# Patient Record
Sex: FEMALE | Race: WHITE
[De-identification: names, ages, dates, MRNs, and addresses within clinical notes are randomized per-mention and may not be internally consistent; named-entity substitution may affect disease eponyms.]

---

## 2021-04-05 ENCOUNTER — HOSPITAL ENCOUNTER (EMERGENCY)
Dept: HOSPITAL 63 - ER | Age: 51
Discharge: HOME | End: 2021-04-05
Payer: SELF-PAY

## 2021-04-05 VITALS — BODY MASS INDEX: 33.2 KG/M2 | HEIGHT: 63 IN | WEIGHT: 187.39 LBS

## 2021-04-05 VITALS — SYSTOLIC BLOOD PRESSURE: 96 MMHG | DIASTOLIC BLOOD PRESSURE: 45 MMHG

## 2021-04-05 DIAGNOSIS — Z90.49: ICD-10-CM

## 2021-04-05 DIAGNOSIS — Z90.89: ICD-10-CM

## 2021-04-05 DIAGNOSIS — Z87.442: ICD-10-CM

## 2021-04-05 DIAGNOSIS — Z88.1: ICD-10-CM

## 2021-04-05 DIAGNOSIS — G43.909: ICD-10-CM

## 2021-04-05 DIAGNOSIS — R10.31: Primary | ICD-10-CM

## 2021-04-05 LAB
ANION GAP SERPL CALC-SCNC: 9 MMOL/L (ref 6–14)
APTT PPP: COLORLESS S
BACTERIA #/AREA URNS HPF: 0 /HPF
BASOPHILS # BLD AUTO: 0 X10^3/UL (ref 0–0.2)
BASOPHILS NFR BLD: 0 % (ref 0–3)
BILIRUB UR QL STRIP: (no result)
CA-I SERPL ISE-MCNC: 17 MG/DL (ref 7–20)
CALCIUM SERPL-MCNC: 9.4 MG/DL (ref 8.5–10.1)
CHLORIDE SERPL-SCNC: 104 MMOL/L (ref 98–107)
CO2 SERPL-SCNC: 29 MMOL/L (ref 21–32)
CREAT SERPL-MCNC: 1.1 MG/DL (ref 0.6–1)
EOSINOPHIL NFR BLD: 0.2 X10^3/UL (ref 0–0.7)
EOSINOPHIL NFR BLD: 2 % (ref 0–3)
ERYTHROCYTE [DISTWIDTH] IN BLOOD BY AUTOMATED COUNT: 14.5 % (ref 11.5–14.5)
FIBRINOGEN PPP-MCNC: CLEAR MG/DL
GFR SERPLBLD BASED ON 1.73 SQ M-ARVRAT: 52.6 ML/MIN
GLUCOSE SERPL-MCNC: 112 MG/DL (ref 70–99)
GLUCOSE UR STRIP-MCNC: (no result) MG/DL
HCT VFR BLD CALC: 39.7 % (ref 36–47)
HGB BLD-MCNC: 13.2 G/DL (ref 12–15.5)
LYMPHOCYTES # BLD: 3.4 X10^3/UL (ref 1–4.8)
LYMPHOCYTES NFR BLD AUTO: 39 % (ref 24–48)
MCH RBC QN AUTO: 32 PG (ref 25–35)
MCHC RBC AUTO-ENTMCNC: 33 G/DL (ref 31–37)
MCV RBC AUTO: 95 FL (ref 79–100)
MONO #: 0.8 X10^3/UL (ref 0–1.1)
MONOCYTES NFR BLD: 9 % (ref 0–9)
NEUT #: 4.3 X10^3UL (ref 1.8–7.7)
NEUTROPHILS NFR BLD AUTO: 50 % (ref 31–73)
NITRITE UR QL STRIP: (no result)
PLATELET # BLD AUTO: 281 X10^3/UL (ref 140–400)
POTASSIUM SERPL-SCNC: 3.7 MMOL/L (ref 3.5–5.1)
RBC # BLD AUTO: 4.19 X10^6/UL (ref 3.5–5.4)
RBC #/AREA URNS HPF: 0 /HPF (ref 0–2)
SODIUM SERPL-SCNC: 142 MMOL/L (ref 136–145)
SP GR UR STRIP: <=1.005
SQUAMOUS #/AREA URNS LPF: (no result) /LPF
UROBILINOGEN UR-MCNC: 0.2 MG/DL
WBC # BLD AUTO: 8.7 X10^3/UL (ref 4–11)
WBC #/AREA URNS HPF: 0 /HPF (ref 0–4)

## 2021-04-05 PROCEDURE — 96374 THER/PROPH/DIAG INJ IV PUSH: CPT

## 2021-04-05 PROCEDURE — 80048 BASIC METABOLIC PNL TOTAL CA: CPT

## 2021-04-05 PROCEDURE — 74176 CT ABD & PELVIS W/O CONTRAST: CPT

## 2021-04-05 PROCEDURE — 36415 COLL VENOUS BLD VENIPUNCTURE: CPT

## 2021-04-05 PROCEDURE — 85025 COMPLETE CBC W/AUTO DIFF WBC: CPT

## 2021-04-05 PROCEDURE — 99284 EMERGENCY DEPT VISIT MOD MDM: CPT

## 2021-04-05 PROCEDURE — 81001 URINALYSIS AUTO W/SCOPE: CPT

## 2021-04-05 NOTE — RAD
Study: CT abdomen/pelvis without intravenous contrast



Indication: Right flank and abdominal pain. History of kidney stones.



Comparison: None.



Technique: Helical CT imaging performed of the abdomen and pelvis without the use of intravenous cont
rast. Sagittal and coronal reformats were obtained. 



One or more of the following individualized dose reduction techniques were utilized for this examinat
ion:  



1. Automated exposure control

2. Adjustment of the mA and/or kV according to patient size

3. Use of iterative reconstruction technique.



Findings:



Inherently limited evaluation without intravenous contrast.



No significant abnormality at the lower chest.



Within normal limits liver. Surgically absent gallbladder. Unremarkable biliary tree given absence of
 the gallbladder. No peripancreatic inflammation. Normal size of the spleen. No adrenal gland mass. N
onobstructing intrarenal stones at the inferior pole of the left kidney. No hydronephrosis on the rig
ht or left. No significant wall thickening of the urinary bladder.



Unremarkable uterus and adnexa. Small amount of well-formed stool within the colon. 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
 absence of the appendix. Nonobstructed small bowel. Unremarkable stomach.



Nonaneurysmal aorta. No lymphadenopathy. No free fluid or pneumoperitoneum. Unremarkable body wall so
ft tissues. Discogenic arthrosis with disc space narrowing and vacuum phenomenon at L5-S1. Mild osseo
us neural foraminal stenosis on the right at this level.



Impression:



1.  No acute abnormality identified throughout the abdomen or pelvis.

2.  Small intrarenal stones at the lower pole of the left kidney. No collecting system obstruction on
 either side.



Electronically signed by: BRANT DAVIS MD (4/5/2021 10:29 PM) [facility]

## 2021-04-05 NOTE — PHYS DOC
Past History


Past Medical History:  Fibromyalgia, Kidney Stones, Migraines, Ovarian Cyst


Past Surgical History:  Appendectomy, Cholecystectomy, Other


Additional Past Surgical Histo:  lipsotripsy x 2, surgery for ovarian cyst


Additional Smoking Information:  


vapes


Alcohol Use:  None





Adult General


Chief Complaint


Chief Complaint:  FLANK PAIN





HPI


HPI


Patient is a 50-year-old female with a past medical history significant for 

multiple episodes of renal lithiasis who presents with a chief complaint of 

flank pain, on the right more than the left, 7 out of 10 in pain, sharp in 

nature with a little radiation to the groin.  States this feels exactly like 

previous renal stones.  Denies any recent travel, illnesses, fevers, chest pain,

shortness of breath, Covid/flu symptoms, hematuria.  States she has some mild 

nausea but no emesis.





Review of Systems


Review of Systems


Review of systems otherwise unremarkable except noted in HPI





Allergies


Allergies





Allergies








Coded Allergies Type Severity Reaction Last Updated Verified


 


  gentamicin Allergy Unknown  4/5/21 Yes











Physical Exam


Physical Exam





Constitutional: Well developed, well nourished, no acute distress, non-toxic 

appearance. []


HENT: Normocephalic, atraumatic, bilateral external ears normal, oropharynx 

moist, no oral exudates, nose normal. []


Eyes: conjunctiva normal, no discharge. [] 


Neck: Normal range of motion, no tenderness, supple, no stridor. [] 


Cardiovascular:Heart rate regular rhythm, no murmur []


Lungs & Thorax:  Bilateral breath sounds clear to auscultation []


Abdomen: soft, no tenderness, no masses, no pulsatile masses. [] 


Skin: Warm, dry, no erythema, no rash. [] 


Back: CVA tenderness. [] 


Extremities: No tenderness, no cyanosis, no clubbing, ROM intact, no edema. [] 


Neurologic: Alert and oriented X 3, normal motor function, normal sensory 

function, no focal deficits noted. []


Psychologic: Affect normal, judgement normal, mood normal. []





Current Patient Data


Vital Signs





                                   Vital Signs








  Date Time  Temp Pulse Resp B/P (MAP) Pulse Ox O2 Delivery O2 Flow Rate FiO2


 


4/5/21 21:05 97.7 67 18 110/67 (81) 98 Room Air  











EKG


EKG


[]





Radiology/Procedures


Radiology/Procedures


[]


Study: CT abdomen/pelvis without intravenous contrast





Indication: Right flank and abdominal pain. History of kidney stones.





Comparison: None.





Technique: Helical CT imaging performed of the abdomen and pelvis without the 

use of intravenous contrast. Sagittal and coronal reformats were obtained. 





One or more of the following individualized dose reduction techniques were 

utilized for this examination:  





1. Automated exposure control


2. Adjustment of the mA and/or kV according to patient size


3. Use of iterative reconstruction technique.





Findings:





Inherently limited evaluation without intravenous contrast.





No significant abnormality at the lower chest.





Within normal limits liver. Surgically absent gallbladder. Unremarkable biliary 

tree given absence of the gallbladder. No peripancreatic inflammation. Normal 

size of the spleen. No adrenal gland mass. Nonobstructing intrarenal stones at 

the inferior pole of the left kidney. No hydronephrosis on the right or left. No

 significant wall thickening of the urinary bladder.





Unremarkable uterus and adnexa. Small amount of well-formed stool within the 

colon. 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 Nonobstructed small bowel. 

Unremarkable stomach.





Nonaneurysmal aorta. No lymphadenopathy. No free fluid or pneumoperitoneum. 

Unremarkable body wall soft tissues. Discogenic arthrosis with disc space 

narrowing and vacuum phenomenon at L5-S1. Mild osseous neural foraminal stenosis

 on the right at this level.





Impression:





1.  No acute abnormality identified throughout the abdomen or pelvis.


2.  Small intrarenal stones at the lower pole of the left kidney. No collecting 

system obstruction on either side.





Electronically signed by: BRANT DAVIS MD (4/5/2021 10:29 PM) [facility]





Heart Score


C/O Chest Pain:  No


Risk Factors:


Risk Factors:  DM, Current or recent (<one month) smoker, HTN, HLP, family 

history of CAD, obesity.


Risk Scores:


Risk Factors:  DM, Current or recent (<one month) smoker, HTN, HLP, family 

history of CAD, obesity.





Course & Med Decision Making


Course & Med Decision Making


Patient is a 50-year-old female who presents with right flank pain for day


Vital signs not concerning.  Physical exam noted above.  Patient given Zofran 

for nausea and fentanyl for pain.


[Laboratory analysis not concerning.  CT notable for renal lithiasis but nothing

 past the renal pelvis.


On reassessment patient was feeling better.  Discussed differential diagnosis 

and need for follow-up.


Advised to call primary care physician first thing the morning to set up a 

follow-up visit in the next week or 2 to discuss ED visit.


Gave strict return precautions to the ED.  Patient grateful, verbalized 

understanding and agreed with plan of discharge.]





Dragon Disclaimer


Dragon Disclaimer


This electronic medical record was generated, in whole or in part, using a voice

recognition dictation system.





Departure


Departure:


Impression:  


   Primary Impression:  


   Flank pain


Disposition:  01 DC HOME SELF CARE/HOMELESS


Condition:  GOOD


Referrals:  


PCP,NO (PCP)


Patient Instructions:  Diet for Kidney Stones, Flank Pain, Kidney Stones





Additional Instructions:  


Please read all the attached information.  You can use Tylenol, ibuprofen and 

Benadryl at home as needed for pain control.  Please stay hydrated.  Please call

your primary care physician first thing in the morning to update them on your 

recent ED visit and set up a follow-up.  Please come back to the ED with new or 

concerning symptoms.











RONNY LAZARO MD                Apr 5, 2021 21:48